# Patient Record
Sex: MALE | Race: BLACK OR AFRICAN AMERICAN | Employment: FULL TIME | URBAN - METROPOLITAN AREA
[De-identification: names, ages, dates, MRNs, and addresses within clinical notes are randomized per-mention and may not be internally consistent; named-entity substitution may affect disease eponyms.]

---

## 2017-08-04 ENCOUNTER — HOSPITAL ENCOUNTER (OUTPATIENT)
Dept: PHYSICAL THERAPY | Age: 42
Discharge: HOME | End: 2017-08-04
Payer: OTHER GOVERNMENT

## 2017-08-04 PROCEDURE — 97161 PT EVAL LOW COMPLEX 20 MIN: CPT

## 2017-08-04 PROCEDURE — 97110 THERAPEUTIC EXERCISES: CPT

## 2017-08-04 NOTE — PROGRESS NOTES
[facility] [address], [address] [phone] fax    Plan of Care/ Statement of Necessity for Physical Therapy Services           Patient name: Chula Bowden Start of Care: 2017   Referral source: Johanna Morgan : 1975    Medical Diagnosis: Pain in right knee [M25.561]   Onset Date:2017    Treatment Diagnosis: R knee pain   Prior Hospitalization: see medical history Provider#: [id number]   Medications: Verified on Patient summary List    Comorbidities: DM, OA, hypothyroidism, HTN, L BRAYDON   Prior Level of Function: retired [employer]. Lives in 1 story home. Works at a [employer]. Functionally independent. Trying to workout more for weight loss but does not do LE exercises due to knee pain    The Plan of Care and following information is based on the information from the initial evaluation. Assessment/ key information: Patient is a 35 y.o.male presenting with Pain in right knee [M25.561]. Mr. Zena Villalta presents to initial PT evaluation with c/o R knee pain worsening over the past 3 years, with most recent exacerbation of symptoms in July causing him to f/u to ED for significant knee pain, swelling, and ROM restrictions. 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 \"He was referred to ortho, however they wished for him to be evaluated by therapy prior to seeing them. Mr. Zena Villalta arrives to therapy evaluation with moderate pain, wearing custom brace and ambulating with antalgic gait pattern on the R. Knee ROM was significantly limited (-9 - 93 deg), and hip and knee strength was impaired. He has need for skilled therapy to address these deficits and to learn activity modification to prevent exeacerbation of symptoms in the future.  Should we see no improvement over the next 4 weeks, we will likely refer back to MD for referral to orthopedics. Patient will benefit from skilled PT services to address deficits and facilitate return to premorbid activity level and promote improved quality of life. Evaluation Complexity History MEDIUM  Complexity : 1-2 comorbidities / personal factors will impact the outcome/ POC ; Examination LOW Complexity : 1-2 Standardized tests and measures addressing body structure, function, activity limitation and / or participation in recreation  ;Presentation LOW Complexity : Stable, uncomplicated  ;Clinical Decision Making MEDIUM Complexity : FOTO score of 26-74  Overall Complexity Rating: LOW   Problem List: pain affecting function, decrease ROM, decrease strength, edema affecting function, impaired gait/ balance, decrease ADL/ functional abilitiies, decrease activity tolerance, decrease flexibility/ joint mobility and decrease transfer abilities   Treatment Plan may include any combination of the following: Therapeutic exercise, Therapeutic activities, Neuromuscular re-education, Physical agent/modality, Gait/balance training, Manual therapy, Aquatic therapy, Patient education, Self Care training, Functional mobility training, Home safety training and Stair training  Patient / Family readiness to learn indicated by: asking questions, trying to perform skills and interest  Persons(s) to be included in education: patient (P)  Barriers to Learning/Limitations: None  Patient Goal (s): to get rid of the pain.   Patient Self Reported Health Status: good  Rehabilitation Potential: good  Short Term Goals: To be accomplished in 1 weeks:  1. Establish HEP for ROM & Strengthening. Long Term Goals: To be accomplished in 4 weeks:  1. Patient will be independent with HEP for ROM & Strengthening. Eval Status: n/a  2. Pt will increase R knee AROM to (- 5 -120 deg to normalize gait pattern. Eval Status:(-9 - 93 deg)   3. Pt will increase FOTO score to 65 points to demonstrate improved functional mobility.    Eval Status: FOTO: 45  4. Pt will increase R quad strength to grossly 5/5 to improve ease with gait/prevent buckling. Eval Status:4-/5 with pain      Frequency / Duration: Patient to be seen 2 times per week for 4 weeks. Patient/ Caregiver education and instruction: Diagnosis, prognosis, self care, activity modification and exercises   [x]  Plan of care has been reviewed with ANTON Ham, PT 8/4/2017 12:01 PM    ________________________________________________________________________    I certify that the above Therapy Services are being furnished while the patient is under my care. I agree with the treatment plan and certify that this therapy is necessary.     Physician's Signature:____________________  Date:____________Time: _________    Please sign and return to [facility] [address]   [facility], [address] [phone] fax

## 2017-08-04 NOTE — PROGRESS NOTES
PT DAILY TREATMENT NOTE 8-    Patient Name: Jolie Collins  Date:2017  : 1975  [x]  Patient  Verified  Payor:  / Plan: [facility]  RETIREES AND DEPENDENTS / Product Type: Juncos Donning /    In time:1100  Out time:1150  Total Treatment Time (min): 50  Visit #: 1 of 8    Treatment Area: Pain in right knee [M25.561]    SUBJECTIVE  Pain Level (0-10 scale): 5  Any medication changes, allergies to medications, adverse drug reactions, diagnosis change, or new procedure performed?: [x] No    [] Yes (see summary sheet for update)  Subjective functional status/changes:   [x] See Eval form in paper chart     OBJECTIVE      42 min [x]Eval                  []Re-Eval         8 min Therapeutic Exercise:  [x] See flow sheet :HEP   Rationale: increase ROM, increase strength, improve coordination, improve balance and increase proprioception to improve the patients ability to perform ADLs. With   [] TE   [] TA   [] neuro   [] other: Patient Education: [x] Review HEP    [] Progressed/Changed HEP based on:   [] positioning   [] body mechanics   [] transfers   [] heat/ice application    [] other:           Pain Level (0-10 scale) post treatment: 5    ASSESSMENT:   [x]  See Evaluation          Goals:  Short Term Goals: To be accomplished in 1 weeks:  1. Establish HEP for ROM & Strengthening.     Long Term Goals: To be accomplished in 4 weeks:  1. Patient will be independent with HEP for ROM & Strengthening. Eval Status: n/a  2. Pt will increase R knee AROM to (- 5 -120 deg to normalize gait pattern. Eval Status:(-9 - 93 deg)   3. Pt will increase FOTO score to 65 points to demonstrate improved functional mobility. Eval Status: FOTO: 45  4. Pt will increase R quad strength to grossly 5/5 to improve ease with gait/prevent buckling.                          Eval Status:4-/5 with pain       PLAN      [x]  Continue plan of care Ayala Cruz, PT 8/4/2017  12:13 PM

## 2017-08-10 ENCOUNTER — HOSPITAL ENCOUNTER (OUTPATIENT)
Dept: PHYSICAL THERAPY | Age: 42
Discharge: HOME | End: 2017-08-10
Payer: OTHER GOVERNMENT

## 2017-08-10 PROCEDURE — 97112 NEUROMUSCULAR REEDUCATION: CPT

## 2017-08-10 PROCEDURE — 97110 THERAPEUTIC EXERCISES: CPT

## 2017-08-10 NOTE — PROGRESS NOTES
PT DAILY TREATMENT NOTE 12    Patient Name: Nava Chong  Date:8/10/2017  : 1975  [x]  Patient  Verified  Payor:  / Plan: [facility]  RETIREES AND DEPENDENTS / Product Type: Gallito Riley /    In time:1032  Out time:1125  Total Treatment Time (min): 48  Visit #: 2 of 8    Treatment Area: Pain in right knee [M25.561]    SUBJECTIVE  Pain Level (0-10 scale): 4  Any medication changes, allergies to medications, adverse drug reactions, diagnosis change, or new procedure performed?: [x] No    [] Yes (see summary sheet for update)  Subjective functional status/changes:   [] No changes reported  No problems, it hurts about the same as it usually does     OBJECTIVE    Modality rationale: PD   Min Type Additional Details    [] Estim:  []Unatt       []IFC  []Premod                        []Other:  []w/ice   []w/heat  Position:  Location:    [] Estim: []Att    []TENS instruct  []NMES                    []Other:  []w/US   []w/ice   []w/heat  Position:  Location:    []  Traction: [] Cervical       []Lumbar                       [] Prone          []Supine                       []Intermittent   []Continuous Lbs:  [] before manual  [] after manual    []  Ultrasound: []Continuous   [] Pulsed                           []1MHz   []3MHz W/cm2:  Location:    []  Iontophoresis with dexamethasone         Location: [] Take home patch   [] In clinic    []  Ice     []  heat  []  Ice massage  []  Laser   []  Anodyne Position:  Location:    []  Laser with stim  []  Other:  Position:  Location:    []  Vasopneumatic Device Pressure:       [] lo [] med [] hi   Temperature: [] lo [] med [] hi   [x] Skin assessment post-treatment:  [x]intact []redness- no adverse reaction    []redness  adverse reaction:       15 min Therapeutic Exercise:  [x] See flow sheet :   Rationale: increase ROM, increase strength, improve coordination, improve balance and increase proprioception to improve the patients ability to perform ADLs.      30 min Neuromuscular Re-education:  [x]  See flow sheet :  Quad re-ed, balance and proprioception for increased stability    Rationale: increase ROM, increase strength, improve coordination, improve balance and increase proprioception  to improve the patients ability to regain stability for activity progression, minimize buckling     8 min Manual Therapy:  MFR ant and post knee, med lat knee, patellar mobs    Rationale: decrease pain, increase ROM, increase tissue extensibility, decrease trigger points and increase postural awareness to regain full motion for gait and improved stability               With   [] TE   [] TA   [x] neuro   [] other: Patient Education: [x] Review HEP    [] Progressed/Changed HEP based on:   [x] positioning   [x] body mechanics   [] transfers   [x] heat/ice application    [] other:      Other Objective/Functional Measures: -7 - 93     Pain Level (0-10 scale) post treatment: 4    ASSESSMENT/Changes in Function: Patient did well, very tight through ITB, patella     Patient will continue to benefit from skilled PT services to modify and progress therapeutic interventions, address functional mobility deficits, address ROM deficits, address strength deficits, analyze and address soft tissue restrictions, analyze and cue movement patterns, analyze and modify body mechanics/ergonomics, assess and modify postural abnormalities, address imbalance/dizziness and instruct in home and community integration to attain remaining goals. []  See Plan of Care  []  See progress note/recertification  []  See Discharge Summary         Progress towards goals / Updated goals:  Short Term Goals: To be accomplished in 1 weeks:  1. Establish HEP for ROM & Strengthening.    MET   Long Term Goals: To be accomplished in 4 weeks:  1.  Patient will be independent with HEP for ROM & Strengthening.                        Eval Status: n/a  CURRENT:initiated HEP  2. Pt will increase R knee AROM to (- 5 -120 deg to normalize gait pattern.                         Eval Status:(-9 - 93 deg)   CURRENT: -7 - 93  3. Pt will increase FOTO score to 65 points to demonstrate improved functional mobility.                       Eval Status: FOTO: 45  CURRENT:assess at 4th viist  4.  Pt will increase R quad strength to grossly 5/5 to improve ease with gait/prevent buckling.                         Eval Status:4-/5 with pain  CURRENT:requires cues for proper activation       PLAN  []  Upgrade activities as tolerated     [x]  Continue plan of care  []  Update interventions per flow sheet       []  Discharge due to:_  []  Other:_      Yasmin Glez, PT 8/10/2017  8:39 AM    Future Appointments  Date Time Provider Filiberto José   8/10/2017 10:30 AM Yasmin Glez PT [facility]   8/17/2017 7:30 AM Toim Marshall PT [facility]   8/18/2017 8:00 AM Tomi Marshall PT [facility]   8/23/2017 8:00 AM Tomi Marshall PT [facility]   8/25/2017 7:30 AM Tomi Marshall PT [facility]   8/30/2017 8:00 AM Tomi Marshall, PT [facility]

## 2017-08-17 ENCOUNTER — HOSPITAL ENCOUNTER (OUTPATIENT)
Dept: PHYSICAL THERAPY | Age: 42
Discharge: HOME | End: 2017-08-17
Payer: OTHER GOVERNMENT

## 2017-08-17 PROCEDURE — 97140 MANUAL THERAPY 1/> REGIONS: CPT

## 2017-08-17 PROCEDURE — 97112 NEUROMUSCULAR REEDUCATION: CPT

## 2017-08-17 PROCEDURE — 97110 THERAPEUTIC EXERCISES: CPT

## 2017-08-17 NOTE — PROGRESS NOTES
PT DAILY TREATMENT NOTE     Patient Name: Maia Mota  Date:2017  : 1975  [x]  Patient  Verified  Payor:  / Plan: [facility]  RETIREES AND DEPENDENTS / Product Type:  /    In time:730  Out time:820  Total Treatment Time (min): 50  Visit #: 3 of 8    Treatment Area: Pain in right knee [M25.561]    SUBJECTIVE  Pain Level (0-10 scale): 3  Any medication changes, allergies to medications, adverse drug reactions, diagnosis change, or new procedure performed?: [x] No    [] Yes (see summary sheet for update)  Subjective functional status/changes:   [] No changes reported  \"I'm doing a little better today, the exercises seem to help. \"    OBJECTIVE    Modality rationale: decrease edema, decrease inflammation and decrease pain to improve the patients ability to perform ADLs.     Min Type Additional Details    [] Estim:  []Unatt       []IFC  []Premod                        []Other:  []w/ice   []w/heat  Position:  Location:    [] Estim: []Att    []TENS instruct  []NMES                    []Other:  []w/US   []w/ice   []w/heat  Position:  Location:    []  Traction: [] Cervical       []Lumbar                       [] Prone          []Supine                       []Intermittent   []Continuous Lbs:  [] before manual  [] after manual    []  Ultrasound: []Continuous   [] Pulsed                           []1MHz   []3MHz W/cm2:  Location:    []  Iontophoresis with dexamethasone         Location: [] Take home patch   [] In clinic   5 [x]  Ice     []  heat  []  Ice massage  []  Laser   []  Anodyne Position:seated  Location:R knee    []  Laser with stim  []  Other:  Position:  Location:    []  Vasopneumatic Device Pressure:       [] lo [] med [] hi   Temperature: [] lo [] med [] hi   [x] Skin assessment post-treatment:  [x]intact []redness- no adverse reaction    []redness  adverse reaction:     10 min Therapeutic Exercise:  [x] See flow sheet :   Rationale: increase ROM, increase strength, improve coordination, improve balance and increase proprioception to improve the patients ability to perform ADLs. 27 min Neuromuscular Re-education:  [x]  See flow sheet :quad re-ed acitvities    Rationale: increase ROM, increase strength, improve coordination, improve balance and increase proprioception  to improve the patients ability to improve ease with mobility, gait. 8 min Manual Therapy:  Gentle contract relax HS stretching, patellar mobs    Rationale: decrease pain, increase ROM, increase tissue extensibility, decrease trigger points and increase postural awareness to improve ROM for gait. With   [] TE   [] TA   [] neuro   [] other: Patient Education: [x] Review HEP    [] Progressed/Changed HEP based on:   [] positioning   [] body mechanics   [] transfers   [] heat/ice application    [] other:      Other Objective/Functional Measures:      Pain Level (0-10 scale) post treatment: 1    ASSESSMENT/Changes in Function: Ms. Sylvia Grimaldo remains painful with end range knee extension and flexion but is steadily improving towards goals. Overall pain rating declining. Patient will continue to benefit from skilled PT services to modify and progress therapeutic interventions, address functional mobility deficits, address ROM deficits, address strength deficits, analyze and address soft tissue restrictions, analyze and cue movement patterns, analyze and modify body mechanics/ergonomics, assess and modify postural abnormalities, address imbalance/dizziness and instruct in home and community integration to attain remaining goals. []  See Plan of Care  []  See progress note/recertification  []  See Discharge Summary         Progress towards goals / Updated goals:  Short Term Goals: To be accomplished in 1 weeks:  1. Establish HEP for ROM & Strengthening.    MET   Long Term Goals: To be accomplished in 4 weeks:  1.  Patient will be independent with HEP for ROM & Strengthening.                        Eval Status: n/a  CURRENT:initiated HEP  2. Pt will increase R knee AROM to (- 5 -120 deg to normalize gait pattern.                         Eval Status:(-9 - 93 deg)   CURRENT: -5 - 93  3. Pt will increase FOTO score to 65 points to demonstrate improved functional mobility.                       Eval Status: FOTO: 45  CURRENT:assess at 4th viist  4.  Pt will increase R quad strength to grossly 5/5 to improve ease with gait/prevent buckling.                         Eval Status:4-/5 with pain  CURRENT:requires cues for proper activation        PLAN  []  Upgrade activities as tolerated     [x]  Continue plan of care  []  Update interventions per flow sheet       []  Discharge due to:_  []  Other:_      Monster Bridges PT 8/17/2017  7:41 AM    Future Appointments  Date Time Provider Filiberto José   8/18/2017 8:00 AM Monster Bridges PT [facility]   8/23/2017 8:00 AM Monster Bridges PT [facility]   8/25/2017 7:30 AM Monster Bridges PT [facility]   8/30/2017 8:00 AM Monster Bridges, PT [facility]

## 2017-08-18 ENCOUNTER — HOSPITAL ENCOUNTER (OUTPATIENT)
Dept: PHYSICAL THERAPY | Age: 42
Discharge: HOME | End: 2017-08-18
Payer: OTHER GOVERNMENT

## 2017-08-18 PROCEDURE — 97112 NEUROMUSCULAR REEDUCATION: CPT

## 2017-08-18 PROCEDURE — 97110 THERAPEUTIC EXERCISES: CPT

## 2017-08-18 NOTE — PROGRESS NOTES
PT DAILY TREATMENT NOTE 12    Patient Name: Harsh Thorpe  Date:2017  : 1975  [x]  Patient  Verified  Payor:  / Plan: [facility]  RETIREES AND DEPENDENTS / Product Type: [employer] /    In time:800  Out time:848  Total Treatment Time (min): 48  Visit #: 4 of 8    Treatment Area: Pain in right knee [M25.561]    SUBJECTIVE  Pain Level (0-10 scale): 5  Any medication changes, allergies to medications, adverse drug reactions, diagnosis change, or new procedure performed?: [x] No    [] Yes (see summary sheet for update)  Subjective functional status/changes:   [] No changes reported  \"I think I overdid it at the gym last night. \"    OBJECTIVE    Modality rationale: decrease edema, decrease inflammation and decrease pain to improve the patients ability to improve ease with mobility.     Min Type Additional Details    [] Estim:  []Unatt       []IFC  []Premod                        []Other:  []w/ice   []w/heat  Position:  Location:    [] Estim: []Att    []TENS instruct  []NMES                    []Other:  []w/US   []w/ice   []w/heat  Position:  Location:    []  Traction: [] Cervical       []Lumbar                       [] Prone          []Supine                       []Intermittent   []Continuous Lbs:  [] before manual  [] after manual    []  Ultrasound: []Continuous   [] Pulsed                           []1MHz   []3MHz W/cm2:  Location:    []  Iontophoresis with dexamethasone         Location: [] Take home patch   [] In clinic   10 [x]  Ice     []  heat  []  Ice massage  []  Laser   []  Anodyne Position:supine with legs on wedge  Location:R knee    []  Laser with stim  []  Other:  Position:  Location:    []  Vasopneumatic Device Pressure:       [] lo [] med [] hi   Temperature: [] lo [] med [] hi   [x] Skin assessment post-treatment:  [x]intact []redness- no adverse reaction    []redness  adverse reaction:       10 min Therapeutic Exercise:  [x] See flow sheet :   Rationale: increase ROM, increase strength and improve coordination to improve the patients ability to perform ADLs. 28 min Neuromuscular Re-education:  [x]  See flow sheet :quad re-ed acitvities    Rationale: increase ROM, increase strength, improve coordination, improve balance and increase proprioception  to improve the patients ease with gait/mobility        With   [] TE   [] TA   [] neuro   [] other: Patient Education: [x] Review HEP    [] Progressed/Changed HEP based on:   [] positioning   [] body mechanics   [] transfers   [] heat/ice application    [] other:      Other Objective/Functional Measures: (-4 - 102 deg)     Pain Level (0-10 scale) post treatment: 3    ASSESSMENT/Changes in Function: held on manual today as patient had more pain from overdoing it at the gym yesterday. Patient will continue to benefit from skilled PT services to modify and progress therapeutic interventions, address functional mobility deficits, address ROM deficits, address strength deficits, analyze and address soft tissue restrictions, analyze and cue movement patterns, analyze and modify body mechanics/ergonomics, assess and modify postural abnormalities, address imbalance/dizziness and instruct in home and community integration to attain remaining goals. []  See Plan of Care  []  See progress note/recertification  []  See Discharge Summary         Progress towards goals / Updated goals:  Short Term Goals: To be accomplished in 1 weeks:  1. Establish HEP for ROM & Strengthening.    MET   Long Term Goals: To be accomplished in 4 weeks:  1. Patient will be independent with HEP for ROM & Strengthening.                        Eval Status: n/a  CURRENT:initiated HEP  2. Pt will increase R knee AROM to (- 5 -120 deg to normalize gait pattern.                         Eval Status:(-9 - 93 deg)   CURRENT: -4 - 102 deg  3. Pt will increase FOTO score to 65 points to demonstrate improved functional mobility.                         Eval Status: FOTO: 45  CURRENT:assess at 4th viist  4.  Pt will increase R quad strength to grossly 5/5 to improve ease with gait/prevent buckling.                         Eval Status:4-/5 with pain  CURRENT:requires cues for proper activation        PLAN  []  Upgrade activities as tolerated     [x]  Continue plan of care  []  Update interventions per flow sheet       []  Discharge due to:_  []  Other:_      Lita Glover PT 8/18/2017  8:23 AM    Future Appointments  Date Time Provider Filiberto José   8/23/2017 8:00 AM Lita Glover PT [facility]   8/25/2017 7:30 AM Lita Glover PT [facility]   8/30/2017 8:00 AM Lita Glover PT [facility]

## 2017-08-23 ENCOUNTER — HOSPITAL ENCOUNTER (OUTPATIENT)
Dept: PHYSICAL THERAPY | Age: 42
Discharge: HOME | End: 2017-08-23
Payer: OTHER GOVERNMENT

## 2017-08-23 PROCEDURE — 97110 THERAPEUTIC EXERCISES: CPT

## 2017-08-23 PROCEDURE — 97112 NEUROMUSCULAR REEDUCATION: CPT

## 2017-08-23 NOTE — PROGRESS NOTES
PT DAILY TREATMENT NOTE 12    Patient Name: Angela Romeo  Date:2017  : 1975  [x]  Patient  Verified  Payor:  / Plan: [facility]  RETIREES AND DEPENDENTS / Product Type: SANDNES /    In time:800  Out time:847  Total Treatment Time (min): 52  Visit #: 5 of 8    Treatment Area: Pain in right knee [M25.561]    SUBJECTIVE  Pain Level (0-10 scale): 3  Any medication changes, allergies to medications, adverse drug reactions, diagnosis change, or new procedure performed?: [x] No    [] Yes (see summary sheet for update)  Subjective functional status/changes:   [] No changes reported  \"I'm doing better with my gym routine. \"    OBJECTIVE          17 min Therapeutic Exercise:  [x] See flow sheet :   Rationale: increase ROM, increase strength and improve coordination to improve the patients ability to perform ADLs. 30 min Neuromuscular Re-education:  [x]  See flow sheet :quad re-ed acitvities    Rationale: increase ROM, increase strength, improve coordination, improve balance and increase proprioception  to improve the patients ease with gait/ mobility. With   [] TE   [] TA   [] neuro   [] other: Patient Education: [x] Review HEP    [] Progressed/Changed HEP based on:   [] positioning   [] body mechanics   [] transfers   [] heat/ice application    [] other:      Other Objective/Functional Measures:  (-3 - 112 deg) R knee AROM    Pain Level (0-10 scale) post treatment: 3    ASSESSMENT/Changes in Function: Ms. Estrella Blades pain is declining slowly, and ROM is coming along nicely. We will continue to challenge knee stability with emphasis on joint protection.      Patient will continue to benefit from skilled PT services to modify and progress therapeutic interventions, address functional mobility deficits, address ROM deficits, address strength deficits, analyze and address soft tissue restrictions, analyze and cue movement patterns, analyze and modify body mechanics/ergonomics, assess and modify postural abnormalities, address imbalance/dizziness and instruct in home and community integration to attain remaining goals. []  See Plan of Care  []  See progress note/recertification  []  See Discharge Summary         Progress towards goals / Updated goals:  Short Term Goals: To be accomplished in 1 weeks:  1. Establish HEP for ROM & Strengthening.    MET   Long Term Goals: To be accomplished in 4 weeks:  1. Patient will be independent with HEP for ROM & Strengthening.                        Eval Status: n/a  CURRENT:initiated HEP  2. Pt will increase R knee AROM to (- 5 -120 deg to normalize gait pattern.                         Eval Status:(-9 - 93 deg)   CURRENT: -3 - 112 deg  3. Pt will increase FOTO score to 65 points to demonstrate improved functional mobility.                       Eval Status: FOTO: 45  CURRENT:assess at 4th viist  4. Pt will increase R quad strength to grossly 5/5 to improve ease with gait/prevent buckling.                         Eval Status:4-/5 with pain  CURRENT:improving with SLR & closed chain quad strengthening.      PLAN  []  Upgrade activities as tolerated     [x]  Continue plan of care  []  Update interventions per flow sheet       []  Discharge due to:_  []  Other:_      Yuli Erickson PT 8/23/2017  8:10 AM    Future Appointments  Date Time Provider Filiberto José   8/25/2017 7:30 AM [facility]   8/30/2017 8:00 AM CIRO Garcia [facility]

## 2017-08-25 ENCOUNTER — HOSPITAL ENCOUNTER (OUTPATIENT)
Dept: PHYSICAL THERAPY | Age: 42
Discharge: HOME | End: 2017-08-25
Payer: OTHER GOVERNMENT

## 2017-08-25 PROCEDURE — 97110 THERAPEUTIC EXERCISES: CPT

## 2017-08-25 PROCEDURE — 97112 NEUROMUSCULAR REEDUCATION: CPT

## 2017-08-25 NOTE — PROGRESS NOTES
PT DAILY TREATMENT NOTE     Patient Name: Jolie Collins  Date:2017  : 1975  [x]  Patient  Verified  Payor:  / Plan: [facility]  RETIREES AND DEPENDENTS / Product Type: Lesvia Donning /    In time:735  Out time:820  Total Treatment Time (min): 45  Visit #: 6 of 8    Treatment Area: Pain in right knee [M25.561]    SUBJECTIVE  Pain Level (0-10 scale): 3  Any medication changes, allergies to medications, adverse drug reactions, diagnosis change, or new procedure performed?: [x] No    [] Yes (see summary sheet for update)  Subjective functional status/changes:   [] No changes reported  'Well there's no such thing as not being in pain but I'm doing good. \"    OBJECTIVE      20 min Therapeutic Exercise:  [x] See flow sheet :   Rationale: increase ROM, increase strength, improve coordination, improve balance and increase proprioception to improve the patients ability to perform ADLs. 25 min Neuromuscular Re-education:  [x]  See flow sheet :quad re-ed, balance and stability activities   Rationale: increase ROM, increase strength and improve coordination  to improve the patients ability to perform ADLs. With   [] TE   [] TA   [] neuro   [] other: Patient Education: [x] Review HEP    [] Progressed/Changed HEP based on:   [] positioning   [] body mechanics   [] transfers   [] heat/ice application    [] other:      Other Objective/Functional Measures:      Pain Level (0-10 scale) post treatment: (-3 - 120 deg)    ASSESSMENT/Changes in Function: Ms. Sai Peres has seen good results with ROM & quad strengthening while managing pain. We will reassess nv to determine continued PT vs. Transition to HEP.      Patient will continue to benefit from skilled PT services to modify and progress therapeutic interventions, address functional mobility deficits, address ROM deficits, address strength deficits, analyze and address soft tissue restrictions, analyze and cue movement patterns, analyze and modify body mechanics/ergonomics, assess and modify postural abnormalities, address imbalance/dizziness and instruct in home and community integration to attain remaining goals. []  See Plan of Care  []  See progress note/recertification  []  See Discharge Summary         Progress towards goals / Updated goals:  Short Term Goals: To be accomplished in 1 weeks:   `1. Establish HEP for ROM & Strengthening.     MET   Long Term Goals: To be accomplished in 4 weeks:  1. Patient will be independent with HEP for ROM & Strengthening.                        Eval Status: n/a   CURRENT:initiated HEP  2. Pt will increase R knee AROM to (- 5 -120 deg to normalize gait pattern.                         Eval Status:(-9 - 93 deg)    CURRENT: -3 - 120 deg  3. Pt will increase FOTO score to 65 points to demonstrate improved functional mobility.                       Eval Status: FOTO: 45   CURRENT:assess at 4th viist  4.  Pt will increase R quad strength to grossly 5/5 to improve ease with gait/prevent buckling.                         Eval Status:4-/5 with pain   CURRENT:improving with SLR & closed chain quad strengthening.        PLAN  []  Upgrade activities as tolerated     [x]  Continue plan of care  []  Update interventions per flow sheet       []  Discharge due to:_  []  Other:_      Kaylin De La Paz PT 8/25/2017  7:27 AM    Future Appointments  Date Time Provider Filiberto José   8/25/2017 7:30 AM [address] [facility] [address] [facility]   8/30/2017 8:00 AM Kaylin De La Paz PT [facility]

## 2017-08-30 ENCOUNTER — HOSPITAL ENCOUNTER (OUTPATIENT)
Dept: PHYSICAL THERAPY | Age: 42
Discharge: HOME | End: 2017-08-30
Payer: OTHER GOVERNMENT

## 2017-08-30 PROCEDURE — 97112 NEUROMUSCULAR REEDUCATION: CPT

## 2017-08-30 PROCEDURE — 97110 THERAPEUTIC EXERCISES: CPT

## 2017-08-30 NOTE — PROGRESS NOTES
PT DAILY TREATMENT NOTE     Patient Name: Harsh Thorpe  Date:2017  : 1975  [x]  Patient  Verified  Payor:  / Plan: [facility]  RETIREES AND DEPENDENTS / Product Type: Prudy Navy /    In time:800  Out time:852  Total Treatment Time (min): 52  Visit #: 7 of 8    Treatment Area: Pain in right knee [M25.561]    SUBJECTIVE  Pain Level (0-10 scale): 3  Any medication changes, allergies to medications, adverse drug reactions, diagnosis change, or new procedure performed?: [x] No    [] Yes (see summary sheet for update)  Subjective functional status/changes:   [] No changes reported  \"I'm doing better. \"    OBJECTIVE    25 min Therapeutic Exercise:  [x] See flow sheet :   Rationale: increase ROM, increase strength and improve coordination to improve the patients ability to perform ADLs. 27 min Neuromuscular Re-education:  [x]  See flow sheet :   Rationale: increase ROM, increase strength, improve coordination, improve balance and increase proprioception  to improve the patients ability to normalize gait. With   [] TE   [] TA   [] neuro   [] other: Patient Education: [x] Review HEP    [] Progressed/Changed HEP based on:   [] positioning   [] body mechanics   [] transfers   [] heat/ice application    [] other:      Other Objective/Functional Measures:      Pain Level (0-10 scale) post treatment: 3    ASSESSMENT/Changes in Function: see PN    Patient will continue to benefit from skilled PT services to modify and progress therapeutic interventions, address functional mobility deficits, address ROM deficits, address strength deficits, analyze and address soft tissue restrictions, analyze and cue movement patterns, analyze and modify body mechanics/ergonomics, assess and modify postural abnormalities, address imbalance/dizziness and instruct in home and community integration to attain remaining goals.      []  See Plan of Care  [x]  See progress note/recertification  []  See Discharge Summary         Goals: to be achieved in 4 weeks:  1. Pt will increase R knee AROM to (- 3 -125 deg to normalize gait pattern.                        PN status:  -3 - 120 deg  2. Pt will increase FOTO score to 65 points to demonstrate improved functional mobility.                      IV status:39  3. Pt will increase R quad strength to grossly 5/5 to improve ease with gait/prevent buckling.                        PN status:4+/5   4. Pt will perform 1 flight of stairs with reciprocal pattern to improve ease with household negotiation. PN status; going up stairs with single step pattern, descending sideways due to knee pain      PLAN  [x]  Upgrade activities as tolerated     []  Continue plan of care  []  Update interventions per flow sheet       []  Discharge due to:_  []  Other:_      Jorge Luis Weber PT 8/30/2017  8:43 AM    No future appointments.

## 2017-08-30 NOTE — PROGRESS NOTES
[facility] [place] [address]  [address] [facility] [address] [address], [address] [phone] fax    Physician Update  [x] Progress Note  [] Discharge Summary    Patient name: Isaias Mistry Start of Care: 2017   Referral source: Radu Wilhelm : 1975                          Medical Diagnosis: Pain in right knee [M25.561] Onset Date:2017                          Treatment Diagnosis: R knee pain   Prior Hospitalization: see medical history Provider#: [id number]   Medications: Verified on Patient summary List    Comorbidities: DM, OA, hypothyroidism, HTN, L BRAYDON   Prior Level of Function: retired [employer]. Lives in 1 story home. Works at a [employer] center. Functionally independent. Trying to workout more for weight loss but does not do LE exercises due to knee pain     Visits from Start of Care: 7    Missed Visits: 0    Progress towards Goals:   Short Term Goals: To be accomplished in 1 weeks:                        `1. Establish HEP for ROM & Strengthening.                          MET   [facility] be accomplished in 4 weeks:  1. Patient will be independent with HEP for ROM & Strengthening.                        Eval Status: n/a                        MET  2. Pt will increase R knee AROM to (- 5 -120 deg to normalize gait pattern.                         Eval Status:(-9 - 93 deg)                         PROGRESSING:  -3 - 120 deg  3. Pt will increase FOTO score to 65 points to demonstrate improved functional mobility.                       Eval Status: FOTO: 45                        NOT MET: 39  4.  Pt will increase R quad strength to grossly 5/5 to improve ease with gait/prevent buckling.                         Eval Status:4-/5 with pain                        PROGRESSIN+/5      Functional Gains: hips stronger, less buckling, overall pain better  Functional Deficits: weakness in R quad, pain/ache with prolonged sitting, coming down steps  % improvement: 30%  Pain   Average: 3/10       Best: 3/10     Worst: 6/10 - unsure of exacerbating factor  Patient Goal: \"to improve my walking, limit pain, achieve a better range of motion. \"     Goals: to be achieved in 4 weeks:  1. Pt will increase R knee AROM to (- 3 -125 deg to normalize gait pattern.                        PN status:  -3 - 120 deg  2. Pt will increase FOTO score to 65 points to demonstrate improved functional mobility.                      EQ status:39  3. Pt will increase R quad strength to grossly 5/5 to improve ease with gait/prevent buckling.                        PN status:4+/5   4. Pt will perform 1 flight of stairs with reciprocal pattern to improve ease with household negotiation. PN status; going up stairs with single step pattern, descending sideways due to knee pain    ASSESSMENT/RECOMMENDATIONS:   Mr. Rafi Crowe has seen excellent response to PT thus far for improved strength, ROM & decline in pain. He remains limited with end range quad strength, stair negotiation, and squatting activities, and will continues to benefit from skilled PT to address remaining functional limitations. [x]Continue therapy per initial plan/protocol at a frequency of  2 x per week for 4 weeks      Thank you for this referral.   Jorge Luis Weber, PT 8/30/2017 7:40 AM  NOTE TO PHYSICIAN:  Lula Mercado [address]   FAX TO [facility]: [phone]  If you are unable to process this request in 24 hours please contact our office: [phone]    []  I have read the above report and request that my patient continue as recommended. []  I have read the above report and request that my patient continue therapy with the following changes/special instructions:________________________________________  []I have read the above report and request that my patient be discharged from therapy.     Physicians signature: ______________________________Date: _____Time:_______

## 2017-09-06 ENCOUNTER — APPOINTMENT (OUTPATIENT)
Dept: PHYSICAL THERAPY | Age: 42
End: 2017-09-06
Payer: OTHER GOVERNMENT

## 2017-09-07 ENCOUNTER — APPOINTMENT (OUTPATIENT)
Dept: PHYSICAL THERAPY | Age: 42
End: 2017-09-07
Payer: OTHER GOVERNMENT

## 2017-09-08 ENCOUNTER — HOSPITAL ENCOUNTER (OUTPATIENT)
Dept: PHYSICAL THERAPY | Age: 42
Discharge: HOME | End: 2017-09-08
Payer: OTHER GOVERNMENT

## 2017-09-08 PROCEDURE — 97112 NEUROMUSCULAR REEDUCATION: CPT

## 2017-09-08 PROCEDURE — 97110 THERAPEUTIC EXERCISES: CPT

## 2017-09-08 NOTE — PROGRESS NOTES
PT DAILY TREATMENT NOTE 12-    Patient Name: Angela Romeo  Date:2017  : 1975  [x]  Patient  Verified  Payor:  / Plan: [facility]  RETIREES AND DEPENDENTS / Product Type: Decorah Lama /    In time:800  Out time:850  Total Treatment Time (min): 50  Visit #: 1 of 8    Treatment Area: Pain in right knee [M25.561]    SUBJECTIVE  Pain Level (0-10 scale): 4  Any medication changes, allergies to medications, adverse drug reactions, diagnosis change, or new procedure performed?: [x] No    [] Yes (see summary sheet for update)  Subjective functional status/changes:   [] No changes reported  \"I'm having more pain at night than anything. But I'm fine during the day. \"    OBJECTIVE    25 min Therapeutic Exercise:  [x] See flow sheet :   Rationale: increase ROM, increase strength and improve coordination to improve the patients ability to perform ADLs. 25 min Neuromuscular Re-education:  [x]  See flow sheet :quad re-ed, balance training. Rationale: increase ROM, increase strength, improve coordination, improve balance and increase proprioception  to improve the patients ease with mobility. With   [] TE   [] TA   [] neuro   [] other: Patient Education: [x] Review HEP    [] Progressed/Changed HEP based on:   [] positioning   [] body mechanics   [] transfers   [] heat/ice application    [] other:      Other Objective/Functional Measures: 2     Pain Level (0-10 scale) post treatment: 2-3    ASSESSMENT/Changes in Function: Mr. Dominick Marie was challenged with progression to dynamic activities today, and flexibility limits a good portion of dynamic warmup.      Patient will continue to benefit from skilled PT services to modify and progress therapeutic interventions, address functional mobility deficits, address ROM deficits, address strength deficits, analyze and address soft tissue restrictions, analyze and cue movement patterns, analyze and modify body mechanics/ergonomics, assess and modify postural abnormalities, address imbalance/dizziness and instruct in home and community integration to attain remaining goals. []  See Plan of Care  []  See progress note/recertification  []  See Discharge Summary         Progress towards goals / Updated goals:  1. Pt will increase R knee AROM to (- 3 -125 deg to normalize gait pattern.                        PN status:  -3 - 120 deg  2. Pt will increase FOTO score to 65 points to demonstrate improved functional mobility.                      XP status:39  3. Pt will increase R quad strength to grossly 5/5 to improve ease with gait/prevent buckling.                        PN status:4+/5   4. Pt will perform 1 flight of stairs with reciprocal pattern to improve ease with household negotiation.                                                PN status; going up stairs with single step pattern, descending sideways due to knee pain       PLAN  []  Upgrade activities as tolerated     [x]  Continue plan of care  []  Update interventions per flow sheet       []  Discharge due to:_  []  Other:_      Margie Peña PT 9/8/2017  8:42 AM    Future Appointments  Date Time Provider Filiberto José   9/14/2017 9:30 AM Margie Peña PT [facility]   9/19/2017 7:30 AM [facility]ie Peña PT [facility]   9/21/2017 7:30 AM Margie Peña PT [facility]   9/25/2017 8:00 AM [facility]   9/27/2017 8:00 AM [facility]

## 2017-09-12 ENCOUNTER — APPOINTMENT (OUTPATIENT)
Dept: PHYSICAL THERAPY | Age: 42
End: 2017-09-12
Payer: OTHER GOVERNMENT

## 2017-09-14 ENCOUNTER — HOSPITAL ENCOUNTER (OUTPATIENT)
Dept: PHYSICAL THERAPY | Age: 42
Discharge: HOME | End: 2017-09-14
Payer: OTHER GOVERNMENT

## 2017-09-14 PROCEDURE — 97112 NEUROMUSCULAR REEDUCATION: CPT

## 2017-09-14 PROCEDURE — 97110 THERAPEUTIC EXERCISES: CPT

## 2017-09-14 NOTE — PROGRESS NOTES
PT DAILY TREATMENT NOTE 12    Patient Name: Justin Ball  Date:2017  : 1975  [x]  Patient  Verified  Payor:  / Plan: [facility]  RETIREES AND DEPENDENTS / Product Type:  /    In time:930  Out time:1025  Total Treatment Time (min): 54  Visit #: 2 of 8    Treatment Area: Pain in right knee [M25.561]    SUBJECTIVE  Pain Level (0-10 scale): 4  Any medication changes, allergies to medications, adverse drug reactions, diagnosis change, or new procedure performed?: [x] No    [] Yes (see summary sheet for update)  Subjective functional status/changes:   [] No changes reported  \"I'm doing ok today, a little stiff. \"      OBJECTIVE      15 min Therapeutic Exercise:  [x] See flow sheet :   Rationale: increase ROM, increase strength and improve coordination to improve the patients ability to perform ADLs. 30 min Neuromuscular Re-education:  [x]  See flow sheet :quad re-ed & balance training   Rationale: increase ROM, increase strength, improve coordination, improve balance and increase proprioception  to improve the patients ability to normalize gait & balance. With   [] TE   [] TA   [] neuro   [] other: Patient Education: [x] Review HEP    [] Progressed/Changed HEP based on:   [] positioning   [] body mechanics   [] transfers   [] heat/ice application    [] other:      Other Objective/Functional Measures:      Pain Level (0-10 scale) post treatment: 4    ASSESSMENT/Changes in Function: Mr. Griselda Michael did better today with exercises and was able to progress with stability activities. He did have some irritation with SB bridges to held off on those and held on SLR to prevent pain.     Patient will continue to benefit from skilled PT services to modify and progress therapeutic interventions, address functional mobility deficits, address ROM deficits, address strength deficits, analyze and address soft tissue restrictions, analyze and cue movement patterns, analyze and modify body mechanics/ergonomics, assess and modify postural abnormalities, address imbalance/dizziness and instruct in home and community integration to attain remaining goals. []  See Plan of Care  []  See progress note/recertification  []  See Discharge Summary         Progress towards goals / Updated goals:  1. Pt will increase R knee AROM to (- 3 -125 deg to normalize gait pattern.                        PN status:  -3 - 120 deg  2. Pt will increase FOTO score to 65 points to demonstrate improved functional mobility.                      AY status:39  3. Pt will increase R quad strength to grossly 5/5 to improve ease with gait/prevent buckling.                        PN status:4+/5   4.  Pt will perform 1 flight of stairs with reciprocal pattern to improve ease with household negotiation.                                               PN status; going up stairs with single step pattern, descending sideways due to knee pain       PLAN  []  Upgrade activities as tolerated     [x]  Continue plan of care  []  Update interventions per flow sheet       []  Discharge due to:_  []  Other:_      Enrique Caceres PT 9/14/2017  9:38 AM    Future Appointments  Date Time Provider Filiberto José   9/19/2017 7:30 AM Enrique Caceres PT [facility]   9/21/2017 7:30 AM CIRO Sauer [facility]   9/25/2017 8:00 AM Enrique Caceres PT [facility]   9/27/2017 8:00 AM CIRO Sauer [facility]

## 2017-09-19 ENCOUNTER — HOSPITAL ENCOUNTER (OUTPATIENT)
Dept: PHYSICAL THERAPY | Age: 42
Discharge: HOME | End: 2017-09-19
Payer: OTHER GOVERNMENT

## 2017-09-19 PROCEDURE — 97110 THERAPEUTIC EXERCISES: CPT

## 2017-09-19 PROCEDURE — 97112 NEUROMUSCULAR REEDUCATION: CPT

## 2017-09-19 NOTE — PROGRESS NOTES
PT DAILY TREATMENT NOTE 1216    Patient Name: Dominique Telugu  Date:2017  : 1975  [x]  Patient  Verified  Payor:  / Plan: [facility]  RETIREES AND DEPENDENTS / Product Type:  /    In time:735  Out time:830  Total Treatment Time (min): 55  Visit #: 3 of 8    Treatment Area: Pain in right knee [M25.561]    SUBJECTIVE  Pain Level (0-10 scale): 3-4  Any medication changes, allergies to medications, adverse drug reactions, diagnosis change, or new procedure performed?: [x] No    [] Yes (see summary sheet for update)  Subjective functional status/changes:   [] No changes reported  \"I'm having some pain at night. \"    OBJECTIVE    Modality rationale: decrease edema, decrease inflammation and decrease pain to improve the patients ability to improve ease with mobility.     Min Type Additional Details    [] Estim:  []Unatt       []IFC  []Premod                        []Other:  []w/ice   []w/heat  Position:  Location:    [] Estim: []Att    []TENS instruct  []NMES                    []Other:  []w/US   []w/ice   []w/heat  Position:  Location:    []  Traction: [] Cervical       []Lumbar                       [] Prone          []Supine                       []Intermittent   []Continuous Lbs:  [] before manual  [] after manual    []  Ultrasound: []Continuous   [] Pulsed                           []1MHz   []3MHz W/cm2:  Location:    []  Iontophoresis with dexamethasone         Location: [] Take home patch   [] In clinic   10 [x]  Ice     []  heat  []  Ice massage  []  Laser   []  Anodyne Position:seated  Location:R knee pain    []  Laser with stim  []  Other:  Position:  Location:    []  Vasopneumatic Device Pressure:       [] lo [] med [] hi   Temperature: [] lo [] med [] hi   [x] Skin assessment post-treatment:  [x]intact []redness- no adverse reaction    []redness  adverse reaction:       20 min Therapeutic Exercise:  [x] See flow sheet :   Rationale: increase ROM, increase strength and improve coordination to improve the patients ability to perform ADLs. 25 min Neuromuscular Re-education:  [x]  See flow sheet :quad re-ed & balance training   Rationale: increase ROM, increase strength, improve coordination, improve balance and increase proprioception  to improve the patients ability to improve ease with mobility. With   [] TE   [] TA   [] neuro   [] other: Patient Education: [x] Review HEP    [] Progressed/Changed HEP based on:   [] positioning   [] body mechanics   [] transfers   [] heat/ice application    [] other:      Other Objective/Functional Measures:      Pain Level (0-10 scale) post treatment: 2    ASSESSMENT/Changes in Function: Mr. Dahlia Krishnamurthy had some night pain after workouts at the gym this week, so held off on higher level activities to prevent exacerbation of patellofemoral pain. Patient will continue to benefit from skilled PT services to modify and progress therapeutic interventions, address functional mobility deficits, address ROM deficits, address strength deficits, analyze and address soft tissue restrictions, analyze and cue movement patterns, analyze and modify body mechanics/ergonomics, assess and modify postural abnormalities, address imbalance/dizziness and instruct in home and community integration to attain remaining goals. []  See Plan of Care  []  See progress note/recertification  []  See Discharge Summary         Progress towards goals / Updated goals:  1. Pt will increase R knee AROM to (- 3 -125 deg to normalize gait pattern.                        PN status:  -3 - 120 deg  2. Pt will increase FOTO score to 65 points to demonstrate improved functional mobility.                      EK status:39  3. Pt will increase R quad strength to grossly 5/5 to improve ease with gait/prevent buckling.                        PN status:4+/5   4. Pt will perform 1 flight of stairs with reciprocal pattern to improve ease with household negotiation.                      PN status; going up stairs with single step pattern, descending sideways due to knee pain     Reciprocal pattern      PLAN  []  Upgrade activities as tolerated     [x]  Continue plan of care  []  Update interventions per flow sheet       []  Discharge due to:_  []  Other:_      Dayron Ham, PT 9/19/2017  7:48 AM    Future Appointments  Date Time Provider Filiberto José   9/21/2017 7:30 AM [facility]   9[facility]25/2017 8:00 AM [facility] Ham[facility]   9/27/2017 8:00 AM Dayron Ham, [facility]

## 2017-09-21 ENCOUNTER — HOSPITAL ENCOUNTER (OUTPATIENT)
Dept: PHYSICAL THERAPY | Age: 42
Discharge: HOME | End: 2017-09-21
Payer: OTHER GOVERNMENT

## 2017-09-21 PROCEDURE — 97112 NEUROMUSCULAR REEDUCATION: CPT

## 2017-09-21 PROCEDURE — 97110 THERAPEUTIC EXERCISES: CPT

## 2017-09-21 NOTE — PROGRESS NOTES
PT DAILY TREATMENT NOTE 12    Patient Name: Bogdna Hernandez  Date:2017  : 1975  [x]  Patient  Verified  Payor:  / Plan: [facility]  RETIREES AND DEPENDENTS / Product Type: Geno Constantin /    In time:730  Out time:825  Total Treatment Time (min): 55  Visit #: 4 of 8    Treatment Area: Pain in right knee [M25.561]    SUBJECTIVE  Pain Level (0-10 scale): 4  Any medication changes, allergies to medications, adverse drug reactions, diagnosis change, or new procedure performed?: [x] No    [] Yes (see summary sheet for update)  Subjective functional status/changes:   [] No changes reported  \"I do some stuff outside of here too. \"    OBJECTIVE    Modality rationale: decrease edema, decrease inflammation and decrease pain to improve the patients ease with walking.     Min Type Additional Details    [] Estim:  []Unatt       []IFC  []Premod                        []Other:  []w/ice   []w/heat  Position:  Location:    [] Estim: []Att    []TENS instruct  []NMES                    []Other:  []w/US   []w/ice   []w/heat  Position:  Location:    []  Traction: [] Cervical       []Lumbar                       [] Prone          []Supine                       []Intermittent   []Continuous Lbs:  [] before manual  [] after manual    []  Ultrasound: []Continuous   [] Pulsed                           []1MHz   []3MHz W/cm2:  Location:    []  Iontophoresis with dexamethasone         Location: [] Take home patch   [] In clinic   10 [x]  Ice     []  heat  []  Ice massage  []  Laser   []  Anodyne Position:seated  Location:R knee    []  Laser with stim  []  Other:  Position:  Location:    []  Vasopneumatic Device Pressure:       [] lo [] med [] hi   Temperature: [] lo [] med [] hi   [x] Skin assessment post-treatment:  [x]intact []redness- no adverse reaction    []redness  adverse reaction:       15 min Therapeutic Exercise:  [x] See flow sheet :   Rationale: increase ROM, increase strength and improve coordination to improve the patients ability to perform ADls. 30 min Neuromuscular Re-education:  [x]  See flow sheet :quad re-ed & balance training   Rationale: increase ROM, increase strength, improve coordination, improve balance and increase proprioception  to improve the patients ability to improve ease with mobility. With   [] TE   [] TA   [] neuro   [] other: Patient Education: [x] Review HEP    [] Progressed/Changed HEP based on:   [] positioning   [] body mechanics   [] transfers   [] heat/ice application    [] other:      Other Objective/Functional Measures:      Pain Level (0-10 scale) post treatment: 3    ASSESSMENT/Changes in Function: Mr. Sylvia Grimaldo did well with progression of exercises today without increase in pain. We will continue to progress stability activities next session while avoiding exacerbation of R quad pain. Patient will continue to benefit from skilled PT services to modify and progress therapeutic interventions, address functional mobility deficits, address ROM deficits, address strength deficits, analyze and address soft tissue restrictions, analyze and cue movement patterns, analyze and modify body mechanics/ergonomics, assess and modify postural abnormalities, address imbalance/dizziness and instruct in home and community integration to attain remaining goals. []  See Plan of Care  []  See progress note/recertification  []  See Discharge Summary         Progress towards goals / Updated goals:  1. Pt will increase R knee AROM to (- 3 -125 deg to normalize gait pattern.                        PN status:  -3 - 120 deg  2. Pt will increase FOTO score to 65 points to demonstrate improved functional mobility.                      AH status:39  3. Pt will increase R quad strength to grossly 5/5 to improve ease with gait/prevent buckling.                        PN status:4+/5   4. Pt will perform 1 flight of stairs with reciprocal pattern to improve ease with household negotiation.                      PN status; going up stairs with single step pattern, descending sideways due to knee pain                          Reciprocal pattern        PLAN  []  Upgrade activities as tolerated     [x]  Continue plan of care  []  Update interventions per flow sheet       []  Discharge due to:_  []  Other:_      Walter Christianson PT 9/21/2017  7:39 AM    Future Appointments  Date Time Provider Filiberto José   9/25/2017 8:00 AM Walter [facility] PT [facility]   9/27/2017 8:00 AM [facility]

## 2017-09-25 ENCOUNTER — HOSPITAL ENCOUNTER (OUTPATIENT)
Dept: PHYSICAL THERAPY | Age: 42
Discharge: HOME | End: 2017-09-25
Payer: OTHER GOVERNMENT

## 2017-09-25 PROCEDURE — 97112 NEUROMUSCULAR REEDUCATION: CPT

## 2017-09-25 PROCEDURE — 97110 THERAPEUTIC EXERCISES: CPT

## 2017-09-25 NOTE — PROGRESS NOTES
PT DAILY TREATMENT NOTE 12    Patient Name: Bogdan Hernandez  Date:2017  : 1975  [x]  Patient  Verified  Payor:  / Plan: [facility]  RETIREES AND DEPENDENTS / Product Type: Ferne Constantin /    In time:800  Out time:852  Total Treatment Time (min): 52  Visit #: 5 of 8    Treatment Area: Pain in right knee [M25.561]    SUBJECTIVE  Pain Level (0-10 scale): 3  Any medication changes, allergies to medications, adverse drug reactions, diagnosis change, or new procedure performed?: [x] No    [] Yes (see summary sheet for update)  Subjective functional status/changes:   [] No changes reported  \"I'm doing ok. \"    OBJECTIVE    Modality rationale: decrease edema, decrease inflammation and decrease pain to improve the patients ability to perform ADLs.     Min Type Additional Details    [] Estim:  []Unatt       []IFC  []Premod                        []Other:  []w/ice   []w/heat  Position:  Location:    [] Estim: []Att    []TENS instruct  []NMES                    []Other:  []w/US   []w/ice   []w/heat  Position:  Location:    []  Traction: [] Cervical       []Lumbar                       [] Prone          []Supine                       []Intermittent   []Continuous Lbs:  [] before manual  [] after manual    []  Ultrasound: []Continuous   [] Pulsed                           []1MHz   []3MHz W/cm2:  Location:    []  Iontophoresis with dexamethasone         Location: [] Take home patch   [] In clinic   10 [x]  Ice     []  heat  []  Ice massage  []  Laser   []  Anodyne Position:seated  Location:R knee    []  Laser with stim  []  Other:  Position:  [id number]:    []  Vasopneumatic Device Pressure:       [] lo [] med [] hi   Temperature: [] lo [] med [] hi   [x] Skin assessment post-treatment:  [x]intact []redness- no adverse reaction    []redness  adverse reaction:         15 min Therapeutic Exercise:  [x] See flow sheet :   Rationale: increase ROM, increase strength and improve coordination to improve the patients ability to perform ADLs. 27 min Neuromuscular Re-education:  [x]  See flow sheet :quad re-ed & balance trainign. Rationale: increase ROM, increase strength, improve coordination, improve balance and increase proprioception  to improve the patients ability to normalize gait. With   [] TE   [] TA   [] neuro   [] other: Patient Education: [x] Review HEP    [] Progressed/Changed HEP based on:   [] positioning   [] body mechanics   [] transfers   [] heat/ice application    [] other:      Other Objective/Functional Measures:      Pain Level (0-10 scale) post treatment: 3    ASSESSMENT/Changes in Function: mr. Hoda Shoemaker was challenged with progression of exercises but reports no pain during activities. Patient will continue to benefit from skilled PT services to modify and progress therapeutic interventions, address functional mobility deficits, address ROM deficits, address strength deficits, analyze and address soft tissue restrictions, analyze and cue movement patterns, analyze and modify body mechanics/ergonomics, assess and modify postural abnormalities, address imbalance/dizziness and instruct in home and community integration to attain remaining goals. []  See Plan of Care  []  See progress note/recertification  []  See Discharge Summary         Progress towards goals / Updated goals:  1. Pt will increase R knee AROM to (- 3 -125 deg to normalize gait pattern.                        PN status:  -3 - 120 deg  2. Pt will increase FOTO score to 65 points to demonstrate improved functional mobility.                      BENAVIDES status:39  3. Pt will increase R quad strength to grossly 5/5 to improve ease with gait/prevent buckling.                        PN status:4+/5   4.  Pt will perform 1 flight of stairs with reciprocal pattern to improve ease with household negotiation.                        PN status; going up stairs with single step pattern, descending sideways due to knee pain                          Reciprocal pattern    PLAN  []  Upgrade activities as tolerated     [x]  Continue plan of care  []  Update interventions per flow sheet       []  Discharge due to:_  []  Other:_      Gabriel Kelly PT 9/25/2017  8:46 AM    Future Appointments  Date Time Provider Filiberto José   9/27/2017 8:00 AM [facility]

## 2017-09-27 ENCOUNTER — APPOINTMENT (OUTPATIENT)
Dept: PHYSICAL THERAPY | Age: 42
End: 2017-09-27
Payer: OTHER GOVERNMENT

## 2017-09-28 ENCOUNTER — HOSPITAL ENCOUNTER (OUTPATIENT)
Dept: PHYSICAL THERAPY | Age: 42
Discharge: HOME | End: 2017-09-28
Payer: OTHER GOVERNMENT

## 2017-09-28 PROCEDURE — 97110 THERAPEUTIC EXERCISES: CPT

## 2017-09-28 PROCEDURE — 97112 NEUROMUSCULAR REEDUCATION: CPT

## 2017-09-28 NOTE — PROGRESS NOTES
PT DAILY TREATMENT NOTE 12    Patient Name: Margie Palmer  Date:2017  : 1975  [x]  Patient  Verified  Payor:  / Plan: [facility]  RETIREES AND DEPENDENTS / Product Type: Heraclio Enedina /    In time:855  Out time:921  Total Treatment Time (min): 26  Visit #: 6 of 8    Treatment Area: Pain in right knee [M25.561]    SUBJECTIVE  Pain Level (0-10 scale): 4  Any medication changes, allergies to medications, adverse drug reactions, diagnosis change, or new procedure performed?: [x] No    [] Yes (see summary sheet for update)  Subjective functional status/changes:   [] No changes reported  \"I'm ok, about the same. \"    OBJECTIVE    Modality rationale: decrease edema, decrease inflammation and decrease pain to improve the patients ability to improve ease with stability.     Min Type Additional Details    [] Estim:  []Unatt       []IFC  []Premod                        []Other:  []w/ice   []w/heat  Position:  Location:    [] Estim: []Att    []TENS instruct  []NMES                    []Other:  []w/US   []w/ice   []w/heat  Position:  Location:    []  Traction: [] Cervical       []Lumbar                       [] Prone          []Supine                       []Intermittent   []Continuous Lbs:  [] before manual  [] after manual    []  Ultrasound: []Continuous   [] Pulsed                           []1MHz   []3MHz W/cm2:  Location:    []  Iontophoresis with dexamethasone         Location: [] Take home patch   [] In clinic   10 [x]  Ice     []  heat  []  Ice massage  []  Laser   []  Anodyne Position:seated  Location:R knee    []  Laser with stim  []  Other:  Position:  Location:    []  Vasopneumatic Device Pressure:       [] lo [] med [] hi   Temperature: [] lo [] med [] hi   [x] Skin assessment post-treatment:  [x]intact []redness- no adverse reaction    []redness  adverse reaction:       10 min Therapeutic Exercise:  [x] See flow sheet :   Rationale: increase ROM, increase strength and improve coordination to improve the patients ability to perform ADLs. 16 min Neuromuscular Re-education:  [x]  See flow sheet :quad re-ed, balance training. Rationale: increase ROM, increase strength, improve coordination, improve balance and increase proprioception  to improve the patients ease with mobility. With   [] TE   [] TA   [] neuro   [] other: Patient Education: [x] Review HEP    [] Progressed/Changed HEP based on:   [] positioning   [] body mechanics   [] transfers   [] heat/ice application    [] other:      Other Objective/Functional Measures:      Pain Level (0-10 scale) post treatment: 1-2    ASSESSMENT/Changes in Function: held off on a good portion of exercises today as Mr. Brandi Abdi c/o R hip pain. Will resume exercises next session pending hip pain has improved. Patient will continue to benefit from skilled PT services to modify and progress therapeutic interventions, address functional mobility deficits, address ROM deficits, address strength deficits, analyze and address soft tissue restrictions, analyze and cue movement patterns, analyze and modify body mechanics/ergonomics, assess and modify postural abnormalities, address imbalance/dizziness and instruct in home and community integration to attain remaining goals. []  See Plan of Care  []  See progress note/recertification  []  See Discharge Summary         Progress towards goals / Updated goals:  1. Pt will increase R knee AROM to (- 3 -125 deg to normalize gait pattern.                        PN status:  -3 - 120 deg  2. Pt will increase FOTO score to 65 points to demonstrate improved functional mobility.                      HR status:39  3. Pt will increase R quad strength to grossly 5/5 to improve ease with gait/prevent buckling.                        PN status:4+/5   4.  Pt will perform 1 flight of stairs with reciprocal pattern to improve ease with household negotiation.                        PN status; going up stairs with single step pattern, descending sideways due to knee pain                          Reciprocal pattern    PLAN  []  Upgrade activities as tolerated     [x]  Continue plan of care  []  Update interventions per flow sheet       []  Discharge due to:_  []  Other:_      Rebecca Anglin, PT 9/28/2017  8:55 AM    Future Appointments  Date Time Provider Filiberto José   9/28/2017 9:00 AM Raffy Blue

## 2017-10-02 ENCOUNTER — HOSPITAL ENCOUNTER (OUTPATIENT)
Dept: PHYSICAL THERAPY | Age: 42
Discharge: HOME | End: 2017-10-02
Payer: OTHER GOVERNMENT

## 2017-10-02 PROCEDURE — 97110 THERAPEUTIC EXERCISES: CPT

## 2017-10-02 PROCEDURE — 97112 NEUROMUSCULAR REEDUCATION: CPT

## 2017-10-02 NOTE — PROGRESS NOTES
PT DAILY TREATMENT NOTE 12    Patient Name: Ena Kailua  Date:10/2/2017  : 1975  [x]  Patient  Verified  Payor:  / Plan: [facility]  RETIREES AND DEPENDENTS / Product Type: Wendy Smart /    In time:900  Out time:945  Total Treatment Time (min): 45  Visit #: 7 of 8    Treatment Area: Pain in right knee [M25.561]    SUBJECTIVE  Pain Level (0-10 scale):  2  Any medication changes, allergies to medications, adverse drug reactions, diagnosis change, or new procedure performed?: [x] No    [] Yes (see summary sheet for update)  Subjective functional status/changes:   [] No changes reported  It is better today, the right hip isn't bothering me as much    OBJECTIVE    Modality rationale: decrease inflammation, decrease pain and increase tissue extensibility to improve the patients ability to regain positional    Min Type Additional Details    [] Estim:  []Unatt       []IFC  []Premod                        []Other:  []w/ice   []w/heat  Position:  Location:    [] Estim: []Att    []TENS instruct  []NMES                    []Other:  []w/US   []w/ice   []w/heat  Position:  Location:    []  Traction: [] Cervical       []Lumbar                       [] Prone          []Supine                       []Intermittent   []Continuous Lbs:  [] before manual  [] after manual    []  Ultrasound: []Continuous   [] Pulsed                           []1MHz   []3MHz W/cm2:  Location:    []  Iontophoresis with dexamethasone         Location: [] Take home patch   [] In clinic   10 [x]  Ice     []  heat  []  Ice massage  []  Laser   []  Anodyne Position:seated  Location:R knee    []  Laser with stim  []  Other:  Position:  Location:    []  Vasopneumatic Device Pressure:       [] lo [] med [] hi   Temperature: [] lo [] med [] hi   [] Skin assessment post-treatment:  []intact []redness- no adverse reaction    []redness  adverse reaction:       12 min Therapeutic Exercise:  [x] See flow sheet :   Rationale: increase ROM, increase strength and improve coordination to improve the patients ability to perform ADLs. 33 min Neuromuscular Re-education:  [x]  See flow sheet :  Balance and proprioceptive ex as noted    Rationale: increase ROM, increase strength, improve coordination, improve balance and increase proprioception  to improve the patients ease with mobility. With   [x] TE   [] TA   [x] neuro   [] other: Patient Education: [x] Review HEP, POC     [] Progressed/Changed HEP based on:   [] positioning   [] body mechanics   [] transfers   [] heat/ice application    [] other:      Other Objective/Functional Measures:      Pain Level (0-10 scale) post treatment: 2    ASSESSMENT/Changes in Function: patient doing well, responding well to challenges to improve right knee stability    Patient will continue to benefit from skilled PT services to modify and progress therapeutic interventions, address functional mobility deficits, address ROM deficits, address strength deficits, analyze and address soft tissue restrictions, analyze and cue movement patterns, analyze and modify body mechanics/ergonomics, assess and modify postural abnormalities, address imbalance/dizziness and instruct in home and community integration to attain remaining goals. []  See Plan of Care  []  See progress note/recertification  []  See Discharge Summary         Progress towards goals / Updated goals:  1. Pt will increase R knee AROM to (- 3 -125 deg to normalize gait pattern.                        PN status:  -3 - 120 deg  CURRENT:assess next visit   2. Pt will increase FOTO score to 65 points to demonstrate improved functional mobility.                      KL status:39  [id number]:[id number] next visit 3. Pt will increase R quad strength to grossly 5/5 to improve ease with gait/prevent buckling.                        PN status:4+/5   CURRENT:assess next visit   4.  Pt will perform 1 flight of stairs with reciprocal pattern to improve ease with household negotiation.                        PN status; going up stairs with single step pattern, descending sideways due to knee pain                          Reciprocal pattern  CURRENT:assess next visit     PLAN  []  Upgrade activities as tolerated     [x]  Continue plan of care  []  Update interventions per flow sheet       []  Discharge due to:_  [x]  Other:_  Progress to HEP at next visit   Anu Hand, PT 10/2/2017  8:57 AM    Future Appointments  Date Time Provider Filiberto José   10/2/2017 9:00 AM [facility]   10/6/2017 7:30 AM [facility]

## 2017-10-06 ENCOUNTER — HOSPITAL ENCOUNTER (OUTPATIENT)
Dept: PHYSICAL THERAPY | Age: 42
Discharge: HOME | End: 2017-10-06
Payer: OTHER GOVERNMENT

## 2017-10-06 PROCEDURE — 97110 THERAPEUTIC EXERCISES: CPT

## 2017-10-06 NOTE — PROGRESS NOTES
PT DISCHARGE DAILY NOTE AND [id number]    Date:10/6/2017  Patient name: Marleen Montgomery Start of Care: 2017   Referral source: Herve Ruizye : 1975                          Medical Diagnosis: Pain in right knee [M25.561] Onset Date:2017                          Treatment Diagnosis: R knee pain   Prior Hospitalization: see medical history Provider#: [id number]   Medications: Verified on Patient summary List    Comorbidities: DM, OA, hypothyroidism, HTN, L BRAYDON   Prior Level of Function: retired [employer]. Lives in 1 story home. Works at a [employer]. Functionally independent. Trying to workout more for weight loss but does not do LE exercises due to knee pain         Visits from Start of Care: 15    Missed Visits: 0    Reporting Period : 17 to 10/6/17    [x]  Patient  Verified  Payor:  / Plan: Kanika Silva DEPENDENTS / Product Type: Blanca Counter /    In time:730  Out time:818  Total Treatment Time (min): 48  Visit #: 8 of 8    SUBJECTIVE  Pain Level (0-10 scale): 2  Any medication changes, allergies to medications, adverse drug reactions, diagnosis change, or new procedure performed?: [x] No    [] Yes (see summary sheet for update)  Subjective functional status/changes:   [] No changes reported  \"I'm doing good. \"    OBJECTIVE      48 min Therapeutic Exercise:  [x] See flow sheet :   Rationale: increase ROM, increase strength, improve coordination, improve balance and increase proprioception to improve the patients ability to transition to HEP.                With   [] TE   [] TA   [] neuro   [] other: Patient Education: [x] Review HEP    [] Progressed/Changed HEP based on:   [] positioning   [] body mechanics   [] transfers   [] heat/ice application    [] other:      Other Objective/Functional Measures:      Functional Gains: stairs, squats, knee ROM  Functional Deficits: stiffness/soreness   % improvement: 80%  Pain   Average: 3-4/10       Best: 2/10     Worst: 5-6/10 - gym activities  Patient Goal: \"to keep getting stronger, and get my daily tasks to be easy again. \"      Pain Level (0-10 scale) post treatment: 2    Summary of Care:  . Pt will increase R knee AROM to (- 3 -125 deg to normalize gait pattern.                        PN status:  -3 - 120 deg    MET: (-1 - 125 deg)  2. Pt will increase FOTO score to 65 points to demonstrate improved functional mobility.                      ZA status:39    PROGRESSION: 55  3. Pt will increase R quad strength to grossly 5/5 to improve ease with gait/prevent buckling.                        PN status:4+/5                MET  4. Pt will perform 1 flight of stairs with reciprocal pattern to improve ease with household negotiation.                        PN status; going up stairs with single step pattern, descending sideways due to knee pain                          MET: performing reciprocal pattern     ASSESSMENT/Changes in Function: Mr. Edward Guzman has been a pleasure to treat and has seen excellent progression of knee ROM, strength, and ease with daily mobility. He does still have some soreness/stiffness after doing higher level activities, and we have discussed activity modification with gym routine. He wishes to continue conservative treatment independently to prevent surgical intervention. We will discharge to updated HEP at this time.      Thank you for this referral!      PLAN  [x]Discontinue therapy: [x]Patient has reached or is progressing toward set goals      []Patient is non-compliant or has abdicated      []Due to lack of appreciable progress towards set [id number], PT 10/6/2017  7:58 AM

## 2017-12-26 ENCOUNTER — HOSPITAL ENCOUNTER (OUTPATIENT)
Dept: PHYSICAL THERAPY | Age: 42
Discharge: HOME | End: 2017-12-26
Payer: OTHER GOVERNMENT

## 2017-12-26 PROCEDURE — 97530 THERAPEUTIC ACTIVITIES: CPT

## 2017-12-26 PROCEDURE — 97140 MANUAL THERAPY 1/> REGIONS: CPT

## 2017-12-26 PROCEDURE — 97162 PT EVAL MOD COMPLEX 30 MIN: CPT

## 2017-12-26 NOTE — PROGRESS NOTES
PT DAILY TREATMENT NOTE     Patient Name: Aihswarya Corbin  Date:2017  : 1975  [x]  Patient  Verified  Payor:  / Plan: [facility]  RETIREES AND DEPENDENTS / Product Type:  /    In time:820  Out time:900  Total Treatment Time (min): 40  Visit #: 1 of 10    Treatment Area: Low back pain [M54.5]    SUBJECTIVE  Pain Level (0-10 scale): 4  Any medication changes, allergies to medications, adverse drug reactions, diagnosis change, or new procedure performed?: [x] No    [] Yes (see summary sheet for update)  Subjective functional status/changes:   [] No changes reported      OBJECTIVE    Modality rationale:    Min Type Additional Details    [] Estim:  []Unatt       []IFC  []Premod                        []Other:  []w/ice   []w/heat  Position:  Location:    [] Estim: []Att    []TENS instruct  []NMES                    []Other:  []w/US   []w/ice   []w/heat  Position:  Location:    []  Traction: [] Cervical       []Lumbar                       [] Prone          []Supine                       []Intermittent   []Continuous Lbs:  [] before manual  [] after manual    []  Ultrasound: []Continuous   [] Pulsed                           []1MHz   []3MHz W/cm2:  Location:    []  Iontophoresis with dexamethasone         Location: [] Take home patch   [] In clinic    []  Ice     []  heat  []  Ice massage  []  Laser   []  Anodyne Position:  Location:    []  Laser with stim  []  Other:  Position:  Location:    []  Vasopneumatic Device Pressure:       [] lo [] med [] hi   Temperature: [] lo [] med [] hi   [] Skin assessment post-treatment:  []intact []redness- no adverse reaction    []redness  adverse reaction:     24 min [x]Eval                  []Re-Eval       8 min Therapeutic Activity:  []  See flow sheet :   Rationale: Patient education on benefits of aquatic therapy, diagnosis  to improve the patients ability to improve overall function    8 min Manual Therapy:  Pelvic alignment assessment, shotgun MET   Rationale: decrease pain and increase tissue extensibility to improve pelvic alignment        With   [] TE   [] TA   [] neuro   [] other: Patient Education: [x] Review HEP    [] Progressed/Changed HEP based on:   [] positioning   [] body mechanics   [] transfers   [] heat/ice application    [] other:      Other Objective/Functional Measures:      Pain Level (0-10 scale) post treatment: 4    ASSESSMENT/Changes in Function:     Patient will continue to benefit from skilled PT services to modify and progress therapeutic interventions, address functional mobility deficits, address ROM deficits, address strength deficits, analyze and address soft tissue restrictions, analyze and cue movement patterns, analyze and modify body mechanics/ergonomics, assess and modify postural abnormalities, address imbalance/dizziness and instruct in home and community integration to attain remaining goals.      [x]  See Plan of Care  []  See progress note/recertification  []  See Discharge Summary         Progress towards goals / Updated goals:  See POC    PLAN  []  Upgrade activities as tolerated     [x]  Continue plan of care  []  Update interventions per flow sheet       []  Discharge due to:_  []  Other:_      Shazia Pruitt PT 12/26/2017  7:42 AM    Future Appointments  Date Time Provider Filiberto José   12/26/2017 8:30 AM Shazia Pruitt PT [facility]

## 2017-12-26 NOTE — PROGRESS NOTES
[facility] DONTRELL OJEDA [facility], [address] [place] [address]  [phone] fax  Plan of Care/ Statement of Necessity for Physical Therapy Services     Patient name: Marleen Montgomery Start of Care: 2017   Referral source: Jolanda Jeans, DO : 1975    Medical Diagnosis: Low back pain [M54.5]   Onset Date:17    Treatment Diagnosis: low back pain   Prior Hospitalization: see medical history Provider#: [id number]   Medications: Verified on Patient summary List    Comorbidities: diabetes, arthritis, high blood pressure, thyroid problems, Left BRAYDNO    Prior Level of Function: Functionally independent, lives alone, works in tech support    The Plan of Care and following information is based on the information from the initial evaluation. Assessment/ key information: Patient is a pleasant 38year old male who presents with complaints of low back pain over the past year. He reports that his pain started as Right sciatic pain in the leg, but as that resolved he noticed back pain. Pain is intermittent, and when present affects bending, squatting, walking, and sitting. Difficulty sitting for long hours at work. MRI confirms bulging disc. At evaluation Patient demonstrates full lumbar AROM throughout, but with pain into extension and rotation. LE strength grossly 5/5, with some pain in the back during resisted hip extension. Decent LE flexibility. Tender to palpation in the lumbar musculature. No radicular symptoms during evaluation. Poor core activation/strength. Overall Patient is a good rehab candidate based on premorbid status, and will benefit from skilled physical therapy in order to address the above deficits. We will initiate in the aquatic setting in order to reduce lumbar and disc compression.     Evaluation Complexity History MEDIUM  Complexity : 1-2 comorbidities / personal factors will impact the outcome/ POC ; Examination LOW Complexity : 1-2 Standardized tests and measures addressing body structure, function, activity limitation and / or participation in recreation  ;Presentation LOW Complexity : Stable, uncomplicated  ;Clinical Decision Making MEDIUM Complexity : FOTO score of 26-74  Overall Complexity Rating: MEDIUM  Problem List: pain affecting function, decrease ROM, decrease strength, edema affecting function, impaired gait/ balance, decrease ADL/ functional abilitiies, decrease activity tolerance, decrease flexibility/ joint mobility and decrease transfer abilities   Treatment Plan may include any combination of the following: Therapeutic exercise, Therapeutic activities, Neuromuscular re-education, Physical agent/modality, Gait/balance training, Manual therapy, Aquatic therapy, Patient education, Self Care training, Functional mobility training, Home safety training and Stair training  Patient / Family readiness to learn indicated by: asking questions, trying to perform skills and interest  Persons(s) to be included in education: patient (P)  Barriers to Learning/Limitations: None  Patient Goal (s): ease pain  Patient Self Reported Health Status: good  Rehabilitation Potential: good    Short Term Goals: To be accomplished in 1 weeks:  Goal: Patient will initiate aquatic therapy without incident or increase in pain in order to progress toward long term goals. Status at last note/certification: n/a  Long Term Goals: To be accomplished in 10 treatments:  Goal: Patient will improve FOTO assessment score to 66 in order to indicate improved functional abilities. Status at last note/certification: 57  Goal: Patient will report no pain during lumbar rotation AROM bilaterally in order to improve ease of ADL. Status at last note/certification: Low back pain  Goal: Patient will report worst low back pain as 4/10 or less in order to improve ease of overall activity and gym routine.   Status at last note/certification: 06/25  Goal: Patient will report at least 65% improvement in overall function in order to progress toward personal goals. Status at last note/certification: n/a    Frequency / Duration: Patient to be seen 2 times per week for 10 treatments. Patient/ Caregiver education and instruction: Diagnosis, prognosis, other benefits of aquatic therapy   [x]  Plan of care has been reviewed with ANTON Min, PT 12/26/2017 7:42 AM  _____________________________________________________________________  I certify that the above Therapy Services are being furnished while the patient is under my care. I agree with the treatment plan and certify that this therapy is necessary.     Physician's Signature:____________________  Date:__________Time:______    Please sign and return to [facility] DONTRELL OJEDA [facility], [address] [place] [address]  [phone] fax

## 2017-12-28 ENCOUNTER — HOSPITAL ENCOUNTER (OUTPATIENT)
Dept: PHYSICAL THERAPY | Age: 42
Discharge: HOME | End: 2017-12-28
Payer: OTHER GOVERNMENT

## 2017-12-28 PROCEDURE — 97113 AQUATIC THERAPY/EXERCISES: CPT

## 2018-01-02 ENCOUNTER — HOSPITAL ENCOUNTER (OUTPATIENT)
Dept: PHYSICAL THERAPY | Age: 43
Discharge: HOME | End: 2018-01-02
Payer: OTHER GOVERNMENT

## 2018-01-02 PROCEDURE — 97113 AQUATIC THERAPY/EXERCISES: CPT

## 2018-01-04 ENCOUNTER — APPOINTMENT (OUTPATIENT)
Dept: PHYSICAL THERAPY | Age: 43
End: 2018-01-04
Payer: OTHER GOVERNMENT

## 2018-01-09 ENCOUNTER — HOSPITAL ENCOUNTER (OUTPATIENT)
Dept: PHYSICAL THERAPY | Age: 43
Discharge: HOME | End: 2018-01-09
Payer: OTHER GOVERNMENT

## 2018-01-09 PROCEDURE — 97113 AQUATIC THERAPY/EXERCISES: CPT

## 2018-01-11 ENCOUNTER — HOSPITAL ENCOUNTER (OUTPATIENT)
Dept: PHYSICAL THERAPY | Age: 43
Discharge: HOME | End: 2018-01-11
Payer: OTHER GOVERNMENT

## 2018-01-11 PROCEDURE — 97113 AQUATIC THERAPY/EXERCISES: CPT

## 2018-01-16 ENCOUNTER — APPOINTMENT (OUTPATIENT)
Dept: PHYSICAL THERAPY | Age: 43
End: 2018-01-16
Payer: OTHER GOVERNMENT

## 2018-01-18 ENCOUNTER — APPOINTMENT (OUTPATIENT)
Dept: PHYSICAL THERAPY | Age: 43
End: 2018-01-18
Payer: OTHER GOVERNMENT

## 2018-01-23 ENCOUNTER — HOSPITAL ENCOUNTER (OUTPATIENT)
Dept: PHYSICAL THERAPY | Age: 43
Discharge: HOME | End: 2018-01-23
Payer: OTHER GOVERNMENT

## 2018-01-23 PROCEDURE — 97113 AQUATIC THERAPY/EXERCISES: CPT

## 2018-01-25 ENCOUNTER — HOSPITAL ENCOUNTER (OUTPATIENT)
Dept: PHYSICAL THERAPY | Age: 43
Discharge: HOME | End: 2018-01-25
Payer: OTHER GOVERNMENT

## 2018-01-25 PROCEDURE — 97113 AQUATIC THERAPY/EXERCISES: CPT

## 2018-01-25 NOTE — PROGRESS NOTES
[facility]  [facility] [address]  [phone] fax    Discharge Summary    Patient name: Jeffie Gosselin Start of Care: 2017   Referral source: Lord Nissa DO : 1975                          Medical Diagnosis: Low back pain [M54.5] Onset Date:17                          Treatment Diagnosis: low back pain   Prior Hospitalization: see medical history Provider#: [id number]   Medications: Verified on Patient summary List    Comorbidities: diabetes, arthritis, high blood pressure, thyroid problems, Left BRAYDON    Prior Level of Function: Functionally independent, lives alone, works in tech support    Visits from Start of Care: 7    Missed Visits: 0    Reporting Period : 17 to 18    Short Term Goals: To be accomplished in 1 weeks:  Goal: Patient will initiate aquatic therapy without incident or increase in pain in order to progress toward long term goals. Status at last note/certification: n/a  Status at discharge: Met  Long Term Goals: To be accomplished in 10 treatments:  Goal: Patient will improve FOTO assessment score to 66 in order to indicate improved functional abilities. Status at last note/certification: 62  Status at discharge: Met, 70  Goal: Patient will report no pain during lumbar rotation AROM bilaterally in order to improve ease of ADL. Status at last note/certification: Low back pain  Status at discharge: Progressing, minimal left sided pain  Goal: Patient will report worst low back pain as 4/10 or less in order to improve ease of overall activity and gym routine. Status at last note/certification:   Status at discharge: Progressing, 6/10  Goal: Patient will report at least 65% improvement in overall function in order to progress toward personal goals.   Status at last note/certification: n/a  Status at discharge: Met, 75%       Assessment/ Summary of Care: Patient has attended aquatic therapy for low back pain, reporting good gains in activity tolerance and limited pain with mobility. Independent with aquatic routine and has access to a pool outside of therapy. Due to good improvement and ability to continue independently with HEP Patient will be discharged at this time. Should he require further treatment in the future we would be pleased to treat him. Thank you for the referral of this Patient.      RECOMMENDATIONS:  [x]Discontinue therapy: [x]Patient has reached or is progressing toward set goals      []Patient is non-compliant or has abdicated      []Due to lack of appreciable progress towards set [facility], PT 1/25/2018 3:58 PM

## 2018-01-30 ENCOUNTER — APPOINTMENT (OUTPATIENT)
Dept: PHYSICAL THERAPY | Age: 43
End: 2018-01-30
Payer: OTHER GOVERNMENT

## 2019-11-14 ENCOUNTER — HOSPITAL ENCOUNTER (OUTPATIENT)
Age: 44
Discharge: HOME | End: 2019-11-14
Attending: INTERNAL MEDICINE
Payer: OTHER GOVERNMENT

## 2019-11-14 ENCOUNTER — HOSPITAL ENCOUNTER (OUTPATIENT)
Dept: ULTRASOUND IMAGING | Age: 44
Discharge: HOME | End: 2019-11-14
Attending: INTERNAL MEDICINE
Payer: OTHER GOVERNMENT

## 2019-11-14 DIAGNOSIS — E78.2 MIXED HYPERLIPIDEMIA: ICD-10-CM

## 2019-11-14 DIAGNOSIS — E03.9 HYPOTHYROIDISM: ICD-10-CM

## 2019-11-14 LAB — CREAT UR-MCNC: 1.2 MG/DL (ref 0.6–1.3)

## 2019-11-14 PROCEDURE — 74011250636 HC RX REV CODE- 250/636: Performed by: INTERNAL MEDICINE

## 2019-11-14 PROCEDURE — 82565 ASSAY OF CREATININE: CPT

## 2019-11-14 PROCEDURE — 76536 US EXAM OF HEAD AND NECK: CPT

## 2019-11-14 PROCEDURE — 70553 MRI BRAIN STEM W/O & W/DYE: CPT

## 2019-11-14 PROCEDURE — A9577 INJ MULTIHANCE: HCPCS | Performed by: INTERNAL MEDICINE

## 2019-11-14 RX ADMIN — GADOBENATE DIMEGLUMINE 20 ML: 529 INJECTION, SOLUTION INTRAVENOUS at 07:00

## 2019-11-27 ENCOUNTER — HOSPITAL ENCOUNTER (OUTPATIENT)
Age: 44
Discharge: HOME | End: 2019-11-27
Attending: ORTHOPAEDIC SURGERY
Payer: COMMERCIAL

## 2019-11-27 DIAGNOSIS — M25.561 RIGHT KNEE PAIN: ICD-10-CM

## 2019-11-27 PROCEDURE — 73721 MRI JNT OF LWR EXTRE W/O DYE: CPT

## 2020-12-21 ENCOUNTER — OFFICE VISIT (OUTPATIENT)
Dept: ORTHOPEDIC SURGERY | Age: 45
End: 2020-12-21
Payer: COMMERCIAL

## 2020-12-21 VITALS
DIASTOLIC BLOOD PRESSURE: 73 MMHG | HEIGHT: 70 IN | WEIGHT: 315 LBS | BODY MASS INDEX: 45.1 KG/M2 | HEART RATE: 97 BPM | TEMPERATURE: 96.5 F | RESPIRATION RATE: 16 BRPM | OXYGEN SATURATION: 99 % | SYSTOLIC BLOOD PRESSURE: 129 MMHG

## 2020-12-21 DIAGNOSIS — M25.859 FEMORAL ACETABULAR IMPINGEMENT: ICD-10-CM

## 2020-12-21 DIAGNOSIS — E66.01 OBESITY, MORBID (HCC): ICD-10-CM

## 2020-12-21 DIAGNOSIS — M25.551 RIGHT HIP PAIN: ICD-10-CM

## 2020-12-21 DIAGNOSIS — M16.11 PRIMARY OSTEOARTHRITIS OF RIGHT HIP: Primary | ICD-10-CM

## 2020-12-21 PROCEDURE — 20610 DRAIN/INJ JOINT/BURSA W/O US: CPT | Performed by: SPECIALIST

## 2020-12-21 PROCEDURE — 73502 X-RAY EXAM HIP UNI 2-3 VIEWS: CPT | Performed by: SPECIALIST

## 2020-12-21 PROCEDURE — 99203 OFFICE O/P NEW LOW 30 MIN: CPT | Performed by: SPECIALIST

## 2020-12-21 RX ORDER — BETAMETHASONE SODIUM PHOSPHATE AND BETAMETHASONE ACETATE 3; 3 MG/ML; MG/ML
3 INJECTION, SUSPENSION INTRA-ARTICULAR; INTRALESIONAL; INTRAMUSCULAR; SOFT TISSUE ONCE
Status: COMPLETED | OUTPATIENT
Start: 2020-12-21 | End: 2020-12-21

## 2020-12-21 RX ADMIN — BETAMETHASONE SODIUM PHOSPHATE AND BETAMETHASONE ACETATE 3 MG: 3; 3 INJECTION, SUSPENSION INTRA-ARTICULAR; INTRALESIONAL; INTRAMUSCULAR; SOFT TISSUE at 09:23

## 2020-12-21 NOTE — PROGRESS NOTES
Patient: Elisabeth Osgood                MRN: [id number]       SSN: [id number]  YOB: 1975        AGE: 50 y.o. SEX: male    PCP: None  12/21/20    Chief Complaint   Patient presents with    Hip Pain     Right     HISTORY:  Elisabeth Osgood is a 50 y.o. male who was referred by the [place] for evaluation and treatment of right hip pain. He has been experiencing right hip pain for the past several months. He does not recall any injury. He feels pain in his lateral hip with standing and walking. His hip stiffens up after he sits for long periods of time. He reports pain radiating from his back down his right thigh. His right hip pain prevents him from working out. He is s/p left BRAYDON done in 2013 at the [facility]. He recently started feeling some left hip discomfort which he attributes to offloading his right hip. Pain Assessment  12/21/2020   Location of Pain Hip   Severity of Pain 6   Quality of Pain Throbbing; Sharp   Duration of Pain Persistent   Frequency of Pain Constant   Aggravating Factors Standing;Walking;Stairs   Limiting Behavior No   Relieving Factors Rest   Result of Injury No     Occupation, etc:  Mr. Erika Jones is a  for [employer]. 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 He served in the [employer]. He lives in [place] with his girlfriend and 15 yo son. He has 2 step-sons. He rides a bike for exercise. He is hypertensive. He is a metformin controlled diabetic. He reports that his most recent A1c is 5.3. His weight fluctuates. Mr. Erika Jones weighs 323 lbs and is 5'10\" tall. No results found for: HBA1C, HGBE8, KRB1TKAE, BRL2RPXD, TYI1ZZGP  Weight Metrics 12/21/2020 11/24/2016   Weight 323 lb 315 lb   BMI 46.35 kg/m2 -       There is no problem list on file for this patient.     REVIEW OF SYSTEMS:    Constitutional Symptoms: Negative   Eyes: Negative   Ears, Nose, Throat and Mouth: Negative   Cardiovascular: Negative   Respiratory: Negative   Genitourinary: Per HPI   Gastrointestinal: Per HPI   Integumentary (Skin and/or Breast): Negative   Musculoskeletal: Per HPI   Endocrine/Rheumatologic: Negative   Neurological: Per HPI   Hematology/Lymphatic: Negative    Allergic/Immunologic: Negative   Phychiatric: Negative    Social History     Socioeconomic History    Marital status: LEGALLY      Spouse name: Not on file    Number of children: Not on file    Years of education: Not on file    Highest education level: Not on file   Occupational History    Not on file   Social Needs    Financial resource strain: Not on file    Food insecurity     Worry: Not on file     Inability: Not on file    Transportation needs     Medical: Not on file     Non-medical: Not on file   Tobacco Use    Smoking status: Not on file   Substance and Sexual Activity    Alcohol use: Not on file    Drug use: Not on file    Sexual activity: Not on file   Lifestyle    Physical activity     Days per week: Not on file     Minutes per session: Not on file    Stress: Not on file   Relationships    Social connections     Talks on phone: Not on file     Gets together: Not on file     Attends Roman [employer] service: Not on file     Active member of club or organization: Not on file     Attends meetings of clubs or organizations: Not on file     Relationship status: Not on file    Intimate partner violence     Fear of current or ex partner: Not on file     Emotionally abused: Not on file     Physically abused: Not on file     Forced sexual activity: Not on file   Other Topics Concern    Not on file   Social History Narrative    Not on file      Allergies   Allergen Reactions    Pcn [Penicillins] Unknown (comments)      Current Outpatient Medications   Medication Sig    oxyCODONE-acetaminophen (PERCOCET) 5-325 mg per tablet Take 1 tablet every 4-6 hours as needed for pain control.   If you were instructed to try over the counter ibuprofen or tylenol, only take the percocet for pain not controlled with the over the counter medication. No current facility-administered medications for this visit. PHYSICAL EXAMINATION:  Visit Vitals  /73 (BP 1 Location: Left arm)   Pulse 97   Temp (!) 96.5 °F (35.8 °C) (Temporal)   Resp 16   Ht 5' 10\" (1.778 m)   Wt 323 lb (146.5 kg)   SpO2 99%   BMI 46.35 kg/m²      ORTHO EXAMINATION:  Examination Right hip Left hip   Skin Intact Intact, well healed incision site   External Rotation ROM 5 10   Internal Rotation ROM 0 10   Trochanteric tenderness + -   Hip flexion contracture - -   Antalgic gait - -   Trendelenberg sign - -   Lumbar tenderness - -   Straight leg raise - -   Calf tenderness - -   Neurovascular Intact Intact    Ambulating with single point cane    TIME OUT:  Chart reviewed for the following:   IDanisha MD, have reviewed the History, Physical and updated the Allergic reactions for [facility] performed immediately prior to start of procedure:  Aime Grey MD, have performed the following reviews on [address] prior to the start of the procedure:          * Patient was identified by name and date of birth   * Agreement on procedure being performed was verified  * Risks and Benefits explained to the patient  * Procedure site verified and marked as necessary  * Patient was positioned for comfort  * Consent was obtained     Time: 9:09 AM     Date of procedure: 12/21/2020  Procedure performed by:  Danisha Song MD  Mr. Arvind Haider tolerated the procedure well with no complications. RADIOGRAPHS:  XR RIGHT HIP 12/21/20 RADHA  IMPRESSION:  AP pelvis and two views - No fractures, severe joint space narrowing, + osteophytes present. Tonnis grade 4, BENJAMIN,  slight radiolucency around the acetabular component with well fixed femoral component on the left    IMPRESSION:      ICD-10-CM ICD-9-CM    1.  Primary osteoarthritis of right hip  M16.11 715.15 betamethasone (CELESTONE) injection 3 mg      DRAIN/INJECT LARGE JOINT/BURSA   2. Right hip pain  M25.551 719.45 AMB POC X-RAY RADEX HIP UNI WITH PELVIS 2-3 VIEWS      betamethasone (CELESTONE) injection 3 mg      DRAIN/INJECT LARGE JOINT/BURSA   3. Femoral acetabular impingement  M25.309 719.85      PLAN: He will be referred for bariatric surgery consultation. After discussing treatment options, patient's right lateral hip was injected with 4 cc Marcaine and 1/2 cc Celestone. We discussed possible need for right hip arthroplasty at some time in the future if pain continues but his weight and age are contraindictations. He will follow up as needed.       Scribed by Ligia Mendoza ([address]) as dictated by Sasha Martinez MD